# Patient Record
Sex: MALE
[De-identification: names, ages, dates, MRNs, and addresses within clinical notes are randomized per-mention and may not be internally consistent; named-entity substitution may affect disease eponyms.]

---

## 2019-05-07 ENCOUNTER — RX ONLY (RX ONLY)
Age: 69
End: 2019-05-07

## 2019-07-19 ENCOUNTER — APPOINTMENT (OUTPATIENT)
Age: 69
Setting detail: DERMATOLOGY
End: 2019-07-20

## 2019-07-19 PROBLEM — D03.9 MELANOMA IN SITU, UNSPECIFIED: Status: ACTIVE | Noted: 2019-07-19

## 2019-07-19 PROCEDURE — OTHER MOHS SURGERY PHONE CONSULTATION: OTHER

## 2019-07-23 ENCOUNTER — APPOINTMENT (OUTPATIENT)
Age: 69
Setting detail: DERMATOLOGY
End: 2019-07-28

## 2019-07-23 DIAGNOSIS — Z71.89 OTHER SPECIFIED COUNSELING: ICD-10-CM

## 2019-07-23 PROBLEM — D03.39 MELANOMA IN SITU OF OTHER PARTS OF FACE: Status: ACTIVE | Noted: 2019-07-23

## 2019-07-23 PROCEDURE — 17311 MOHS 1 STAGE H/N/HF/G: CPT

## 2019-07-23 PROCEDURE — OTHER MIPS QUALITY: OTHER

## 2019-07-23 PROCEDURE — 14041 TIS TRNFR F/C/C/M/N/A/G/H/F: CPT

## 2019-07-23 PROCEDURE — OTHER MOHS SURGERY: OTHER

## 2019-07-23 PROCEDURE — OTHER COUNSELING: OTHER

## 2019-07-23 PROCEDURE — 17312 MOHS ADDL STAGE: CPT

## 2019-07-23 NOTE — PROCEDURE: MOHS SURGERY
Body Location Override (Optional - Billing Will Still Be Based On Selected Body Map Location If Applicable): Left Central Malar Cheek

## 2019-07-23 NOTE — PROCEDURE: MIPS QUALITY
Quality 474: Zoster Vaccination Status: Shingrix Vaccination Administered or Previously Received
Detail Level: Detailed
Quality 226: Preventive Care And Screening: Tobacco Use: Screening And Cessation Intervention: Patient screened for tobacco and is an ex-smoker
Quality 130: Documentation Of Current Medications In The Medical Record: Current Medications Documented
Quality 431: Preventive Care And Screening: Unhealthy Alcohol Use - Screening: Patient screened for unhealthy alcohol use using a single question and scores less than 2 times per year
Quality 143: Oncology: Medical And Radiation- Pain Intensity Quantified: Pain severity quantified, no pain present
Quality 110: Preventive Care And Screening: Influenza Immunization: Influenza Immunization previously received during influenza season

## 2021-07-08 NOTE — PROCEDURE: MOHS SURGERY
1 Eye Protection Verbiage: Before proceeding with the stage, a plastic scleral shield was inserted. The globe was anesthetized with a few drops of 1% lidocaine with 1:100,000 epinephrine. Then, an appropriate sized scleral shield was chosen and coated with lacrilube ointment. The shield was gently inserted and left in place for the duration of each stage. After the stage was completed, the shield was gently removed.

## 2024-12-04 NOTE — PROCEDURE: MOHS SURGERY
Bridget Gupta is a 75 y.o. White (non-) female presents today for audiogram. She saw Dr. Chou 10/29/24 for cancer surveillance and eval of dizziness. She has had about 10 episodes over the past 2 years since her stroke, taht involve off balance. No spinning sensation. She has been doing well overall. Previously imaged s/p stroke with CTA, MRI brain 12/12/23 and CT head 3/17/24. Her left leg continues to give her issues s/p stroke, she has completed PT. Pt wonders if hearing aids would help her feel more balanced.     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     Audiogram:     Left ear:  normal sloping to moderately severe SNHL  Right ear: normal sloping to moderately severe SNHL  Discrimination score: Right ear 96 % and Left ear 92 %   Tympanogram: Right ear Type A and Left ear Type A.       Chief Complaint   Patient presents with    Hearing Problem     Dizziness.  Has been having issues with dizziness ever since her stroke a few years ago.  Feels like this is her last option to see what is causing her dizziness.  Patient states that she has more imbalance issues.  States that in the last 2 years she has maybe had about 10 episodes of dizziness.  Her main concern is her balance.  Denies pain, drainage, itching.  Denies tinnitus and ringing.         Patient Active Problem List   Diagnosis    History of kidney problems    Carcinoma of soft palate (HCC)    Malignant neoplasm oropharynx (HCC)        Reviewed and updated this visit by provider:  Tobacco  Allergies  Meds  Problems  Med Hx  Surg Hx  Fam Hx         Review of Systems   Constitutional: Negative.    HENT:  Positive for hearing loss. Negative for ear discharge and ear pain.    Eyes: Negative.    Respiratory: Negative.     Cardiovascular: Negative.    Gastrointestinal: Negative.    Endocrine: Negative.    Genitourinary: Negative.   Mohs Method Verbiage: An incision at a 45 degree angle following the standard Mohs approach was done and the specimen was harvested as a microscopic controlled layer.

## 2025-04-05 NOTE — PROCEDURE: MOHS SURGERY
[Negative] : Neurological [FreeTextEntry5] : as per HPI Referring Physician (Optional): Veronica Queen PA-C